# Patient Record
Sex: FEMALE | URBAN - NONMETROPOLITAN AREA
[De-identification: names, ages, dates, MRNs, and addresses within clinical notes are randomized per-mention and may not be internally consistent; named-entity substitution may affect disease eponyms.]

---

## 2023-08-04 ENCOUNTER — APPOINTMENT (RX ONLY)
Dept: URBAN - NONMETROPOLITAN AREA CLINIC 19 | Facility: CLINIC | Age: 68
Setting detail: DERMATOLOGY
End: 2023-08-04

## 2023-08-04 VITALS — HEIGHT: 68 IN | WEIGHT: 155 LBS

## 2023-08-04 DIAGNOSIS — L72.0 EPIDERMAL CYST: ICD-10-CM | Status: INADEQUATELY CONTROLLED

## 2023-08-04 PROCEDURE — ? DEFER

## 2023-08-04 PROCEDURE — ? OTHER

## 2023-08-04 PROCEDURE — ? COUNSELING

## 2023-08-04 PROCEDURE — 99202 OFFICE O/P NEW SF 15 MIN: CPT

## 2023-08-04 ASSESSMENT — LOCATION ZONE DERM: LOCATION ZONE: ARM

## 2023-08-04 ASSESSMENT — LOCATION DETAILED DESCRIPTION DERM: LOCATION DETAILED: RIGHT POSTERIOR SHOULDER

## 2023-08-04 ASSESSMENT — LOCATION SIMPLE DESCRIPTION DERM: LOCATION SIMPLE: RIGHT SHOULDER

## 2023-08-04 NOTE — PROCEDURE: DEFER
Introduction Text (Please End With A Colon): Will make office apt. for next available
X Size Of Lesion In Cm (Optional): 0
Detail Level: Detailed

## 2023-09-01 ENCOUNTER — APPOINTMENT (RX ONLY)
Dept: URBAN - NONMETROPOLITAN AREA CLINIC 19 | Facility: CLINIC | Age: 68
Setting detail: DERMATOLOGY
End: 2023-09-01

## 2023-09-01 DIAGNOSIS — L72.0 EPIDERMAL CYST: ICD-10-CM

## 2023-09-01 PROCEDURE — ? EXCISION

## 2023-09-01 PROCEDURE — ? PHOTO-DOCUMENTATION

## 2023-09-01 PROCEDURE — 11424 EXC H-F-NK-SP B9+MARG 3.1-4: CPT

## 2023-09-01 PROCEDURE — ? OTHER

## 2023-09-01 PROCEDURE — 12042 INTMD RPR N-HF/GENIT2.6-7.5: CPT

## 2023-09-01 PROCEDURE — ? COUNSELING

## 2023-09-01 ASSESSMENT — LOCATION SIMPLE DESCRIPTION DERM
LOCATION SIMPLE: POSTERIOR NECK
LOCATION SIMPLE: RIGHT SHOULDER

## 2023-09-01 ASSESSMENT — LOCATION DETAILED DESCRIPTION DERM
LOCATION DETAILED: RIGHT LATERAL TRAPEZIAL NECK
LOCATION DETAILED: RIGHT POSTERIOR SHOULDER

## 2023-09-01 ASSESSMENT — LOCATION ZONE DERM
LOCATION ZONE: NECK
LOCATION ZONE: ARM

## 2023-09-01 NOTE — PROCEDURE: EXCISION
